# Patient Record
Sex: FEMALE | Race: OTHER
[De-identification: names, ages, dates, MRNs, and addresses within clinical notes are randomized per-mention and may not be internally consistent; named-entity substitution may affect disease eponyms.]

---
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
## 2018-07-28 ENCOUNTER — HOSPITAL ENCOUNTER (EMERGENCY)
Dept: HOSPITAL 80 - FED | Age: 18
Discharge: HOME | End: 2018-07-28
Payer: COMMERCIAL

## 2018-07-28 VITALS — SYSTOLIC BLOOD PRESSURE: 103 MMHG | DIASTOLIC BLOOD PRESSURE: 58 MMHG

## 2018-07-28 DIAGNOSIS — E86.9: ICD-10-CM

## 2018-07-28 DIAGNOSIS — R11.2: Primary | ICD-10-CM

## 2018-07-28 NOTE — EDPHY
H & P


Stated Complaint: took multiple ambien with ETOH last night, no SI


Time Seen by Provider: 07/28/18 16:45


HPI/ROS: 





CHIEF COMPLAINT:  Vomiting





HISTORY OF PRESENT ILLNESS:  Patient is a 18-year-old female who comes to the 

emergency department complaining of nausea and vomiting all morning.  She 

states that last night she was drinking and took Ambien.  Her friends told her 

she took too much Ambien.  She has not had a fever.  She denies abdominal pain.

  No vaginal bleeding or discharge.  No urinary symptoms.  She denies pregnancy.








REVIEW OF SYSTEMS:


Constitutional:  denies: chills, fever, recent illness, recent injury


EENTM: denies: blurred vision, double vision, nose congestion


Respiratory: denies: cough, shortness of breath


Cardiac: denies: chest pain, irregular heart rate, lightheadedness, palpitations


Gastrointestinal/Abdominal:  See HPI


Genitourinary: denies: dysuria, frequency, hematuria, pain


Musculoskeletal: denies: joint pain, muscle pain


Skin: denies: lesions, rash, jaundice, bruising


Neurological: denies: headache, numbness, paresthesia, tingling, dizziness, 

weakness


Hematologic/Lymphatic: denies: blood clots, easy bleeding, easy bruising


Immunologic/allergic: denies: HIV/AIDS, transplant








EXAM:


GENERAL:  Well-appearing, well-nourished and in no acute distress.


HEAD:  Atraumatic, normocephalic.


EYES:  Pupils equal round and reactive to light, extraocular movements intact, 

sclera anicteric, conjunctiva are normal.


ENT:  TMs normal, nares patent, oropharynx clear without exudates.  Slightly 

dry mucous membranes.


NECK:  Normal range of motion, supple without lymphadenopathy or JVD.


LUNGS:  Breath sounds clear to auscultation bilaterally and equal.  No wheezes 

rales or rhonchi.


HEART:  Regular rate and rhythm without murmurs, rubs or gallops.


ABDOMEN:  Soft, nontender, normoactive bowel sounds.  No guarding, no rebound.  

No masses appreciated.


BACK:  No CVA tenderness, no spinal tenderness, step-offs or deformities


EXTREMITIES:  Normal range of motion, no pitting or edema.  No clubbing or 

cyanosis.


NEUROLOGICAL:  Cranial nerves II through XII grossly intact.  Normal speech, 

normal gait.  5/5 strength, normal movement in all extremities, normal sensation


PSYCH:  Normal mood, normal affect.


SKIN:  Warm, dry, normal turgor, no visible rashes or lesions.








Source: Patient


Exam Limitations: No limitations





- Personal History


LMP (Females 10-55): 1-7 Days Ago


Current Tetanus/Diphtheria Vaccine: Yes


Current Tetanus Diphtheria and Acellular Pertussis (TDAP): Yes


Tetanus Vaccine Date: 2015





- Medical/Surgical History


Hx Asthma: No


Hx Chronic Respiratory Disease: No


Hx Diabetes: No


Hx Cardiac Disease: No


Hx Renal Disease: No


Hx Cirrhosis: No


Hx Alcoholism: No


Hx HIV/AIDS: No


Hx Splenectomy or Spleen Trauma: No


Other PMH: bi-polar





- Social History


Smoking Status: Never smoked


Constitutional: 


 Initial Vital Signs











Temperature (C)  36.7 C   07/28/18 15:44


 


Heart Rate  95   07/28/18 15:44


 


Respiratory Rate  18   07/28/18 15:44


 


Blood Pressure  109/61   07/28/18 15:44


 


O2 Sat (%)  100   07/28/18 15:44








 











O2 Delivery Mode               Room Air














Allergies/Adverse Reactions: 


 





No Known Allergies Allergy (Verified 07/28/18 15:43)


 








Home Medications: 














 Medication  Instructions  Recorded


 


Levothyroxine [Synthroid 50 mcg 50 mcg PO DAILY06 08/21/17





(*)]  


 


Ambien  01/17/18


 


Levothyroxine  01/17/18


 


ADDERALL 15 MG TABLET  07/28/18


 


Ondansetron Odt [Zofran Odt 4 mg 4 mg PO Q4 PRN #10 tab 07/28/18





(RX)]  














Medical Decision Making


ED Course/Re-evaluation: 





The patient has slightly dry mucous membranes.  Will hydrate and treat with 

Zofran.  Her abdominal exam is benign.  We will observe.





6:15 p.m. the patient is feeling much better.  She is no longer vomiting.  She 

is eager to go home.  She has been hydrated.  We discussed indications for 

returning.  We discussed responsible use of medications and alcohol.


Differential Diagnosis: 





Partial list of the Differential diagnosis considered include but were not 

limited to;  alcohol intoxication, hangover, medication overdose, 

gastroenteritis, vomiting, dehydration and although unlikely based on the 

history and physical exam, I also considered obstruction, ischemia, head injury

, assault, urinary tract infection, pregnancy.  I discussed these differential 

diagnoses and the plan with the patient as well as the usual and expected 

course.  The patient understands that the diagnosis is provisional and that in 

medicine we are not always correct and that further workup is often warranted.  

Usual and customary warnings were given.  All of the patient's questions were 

answered.  The patient was instructed to return to the emergency department 

should the symptoms at all worsen or return, otherwise to followup with the 

physician as we discussed.





- Data Points


Medications Given: 


 








Discontinued Medications





Sodium Chloride (Ns)  1,000 mls @ 0 mls/hr IV ONCE ONE; Wide Open


   PRN Reason: Protocol


   Stop: 07/28/18 16:43


   Last Admin: 07/28/18 16:50 Dose:  1,000 mls


Ondansetron HCl (Zofran)  4 mg IVP EDNOW ONE


   Stop: 07/28/18 16:43


   Last Admin: 07/28/18 16:51 Dose:  4 mg








Departure





- Departure


Disposition: Home, Routine, Self-Care


Clinical Impression: 


Vomiting


Qualifiers:


 Vomiting type: unspecified Vomiting Intractability: non-intractable Nausea 

presence: with nausea Qualified Code(s): R11.2 - Nausea with vomiting, 

unspecified





Condition: Fair


Instructions:  Acute Nausea and Vomiting (ED)


Referrals: 


Patient,NotPresent [Unknown] - As per Instructions


Prescriptions: 


Ondansetron Odt [Zofran Odt 4 mg (RX)] 4 mg PO Q4 PRN #10 tab


 PRN Reason: Nausea & Vomiting